# Patient Record
Sex: MALE | Race: WHITE | RURAL
[De-identification: names, ages, dates, MRNs, and addresses within clinical notes are randomized per-mention and may not be internally consistent; named-entity substitution may affect disease eponyms.]

---
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
## 2020-08-07 ENCOUNTER — OFFICE VISIT (OUTPATIENT)
Dept: PRIMARY CARE CLINIC | Age: 20
End: 2020-08-07

## 2020-08-07 DIAGNOSIS — Z20.828 EXPOSURE TO SARS-ASSOCIATED CORONAVIRUS: Primary | ICD-10-CM

## 2020-08-07 NOTE — PROGRESS NOTES
Pt reports to the flu clinic for covid testing. Pt reports that he recently returned from vacation and his employer is requiring him to be tested prior to returning to work. He denies known exposure and sx at this time. Pt declined to see a doctor today.  KT

## 2020-08-09 LAB — SARS-COV-2, NAA: NOT DETECTED
